# Patient Record
Sex: MALE | Race: WHITE | NOT HISPANIC OR LATINO | Employment: OTHER | URBAN - NONMETROPOLITAN AREA
[De-identification: names, ages, dates, MRNs, and addresses within clinical notes are randomized per-mention and may not be internally consistent; named-entity substitution may affect disease eponyms.]

---

## 2017-05-22 PROBLEM — Z47.89 ORTHOPEDIC AFTERCARE: Status: ACTIVE | Noted: 2017-05-22

## 2018-01-18 PROBLEM — M25.511 ACUTE PAIN OF RIGHT SHOULDER: Status: ACTIVE | Noted: 2018-01-18

## 2018-02-08 PROBLEM — M75.41 IMPINGEMENT SYNDROME OF SHOULDER, RIGHT: Status: ACTIVE | Noted: 2018-02-08

## 2018-09-12 PROBLEM — M25.561 ACUTE PAIN OF RIGHT KNEE: Status: ACTIVE | Noted: 2018-09-12

## 2018-09-12 PROBLEM — M25.562 ACUTE PAIN OF LEFT KNEE: Status: ACTIVE | Noted: 2018-09-12

## 2018-10-10 PROBLEM — M17.0 PRIMARY OSTEOARTHRITIS OF BOTH KNEES: Status: ACTIVE | Noted: 2018-10-10

## 2022-07-09 ENCOUNTER — OFFICE VISIT (OUTPATIENT)
Dept: URGENT CARE | Facility: CLINIC | Age: 57
End: 2022-07-09
Payer: COMMERCIAL

## 2022-07-09 ENCOUNTER — APPOINTMENT (OUTPATIENT)
Dept: RADIOLOGY | Facility: IMAGING CENTER | Age: 57
End: 2022-07-09
Attending: NURSE PRACTITIONER
Payer: COMMERCIAL

## 2022-07-09 ENCOUNTER — HOSPITAL ENCOUNTER (OUTPATIENT)
Facility: MEDICAL CENTER | Age: 57
End: 2022-07-09
Attending: NURSE PRACTITIONER
Payer: COMMERCIAL

## 2022-07-09 VITALS
BODY MASS INDEX: 37.22 KG/M2 | SYSTOLIC BLOOD PRESSURE: 136 MMHG | RESPIRATION RATE: 16 BRPM | HEART RATE: 58 BPM | HEIGHT: 74 IN | WEIGHT: 290 LBS | DIASTOLIC BLOOD PRESSURE: 88 MMHG | OXYGEN SATURATION: 96 % | TEMPERATURE: 97.4 F

## 2022-07-09 DIAGNOSIS — R05.9 COUGH: ICD-10-CM

## 2022-07-09 DIAGNOSIS — J34.89 NASAL CONGESTION WITH RHINORRHEA: ICD-10-CM

## 2022-07-09 DIAGNOSIS — R09.81 NASAL CONGESTION WITH RHINORRHEA: ICD-10-CM

## 2022-07-09 DIAGNOSIS — R53.83 OTHER FATIGUE: ICD-10-CM

## 2022-07-09 DIAGNOSIS — J01.90 ACUTE SINUSITIS, RECURRENCE NOT SPECIFIED, UNSPECIFIED LOCATION: ICD-10-CM

## 2022-07-09 PROCEDURE — U0005 INFEC AGEN DETEC AMPLI PROBE: HCPCS

## 2022-07-09 PROCEDURE — 71046 X-RAY EXAM CHEST 2 VIEWS: CPT | Mod: TC | Performed by: NURSE PRACTITIONER

## 2022-07-09 PROCEDURE — U0003 INFECTIOUS AGENT DETECTION BY NUCLEIC ACID (DNA OR RNA); SEVERE ACUTE RESPIRATORY SYNDROME CORONAVIRUS 2 (SARS-COV-2) (CORONAVIRUS DISEASE [COVID-19]), AMPLIFIED PROBE TECHNIQUE, MAKING USE OF HIGH THROUGHPUT TECHNOLOGIES AS DESCRIBED BY CMS-2020-01-R: HCPCS

## 2022-07-09 PROCEDURE — 99203 OFFICE O/P NEW LOW 30 MIN: CPT | Performed by: NURSE PRACTITIONER

## 2022-07-09 RX ORDER — AMOXICILLIN AND CLAVULANATE POTASSIUM 875; 125 MG/1; MG/1
1 TABLET, FILM COATED ORAL 2 TIMES DAILY
Qty: 14 TABLET | Refills: 0 | Status: SHIPPED | OUTPATIENT
Start: 2022-07-09 | End: 2022-07-16

## 2022-07-09 ASSESSMENT — ENCOUNTER SYMPTOMS
WHEEZING: 0
SORE THROAT: 1
EYE REDNESS: 0
NAUSEA: 0
WEAKNESS: 0
VOMITING: 0
NECK PAIN: 0
DIARRHEA: 0
CHILLS: 0
MYALGIAS: 0
COUGH: 1
SPUTUM PRODUCTION: 1
EYE DISCHARGE: 0
SHORTNESS OF BREATH: 0
DIZZINESS: 0
SINUS PAIN: 1
CARDIOVASCULAR NEGATIVE: 1
CONSTIPATION: 0
FEVER: 0
ABDOMINAL PAIN: 0
HEADACHES: 0

## 2022-07-09 NOTE — PROGRESS NOTES
Subjective     Archie Reid is a 57 y.o. male who presents with Cough (X2 weeks. Would like to be covid tested. At home tests were negative. ), Fatigue, and Pharyngitis            HPI  States has been experiencing nasal congestion, runny nose, postnasal drainage, sore throat, cough with phlegm, fatigue x2 weeks.  Has taken at home COVID test which have been negative.  Has used multiple over-the-counter medications for symptoms including DayQuil, NyQuil, Danielle-[place] plus.  No Mucinex or nasal spray/rinse or salt water gargle.  No history of asthma or pneumonia.  Denies shortness of breath or wheeze.  Requesting COVID PCR test today and evaluation for bacterial infection after 2 weeks of symptoms.    PMH:  has a past medical history of Allergy, Asthma (5/15/2017), At risk for sleep apnea (5/15/2017), Blood transfusion without reported diagnosis, Cancer (HCC) (5/15/2017), Cold (5/15/2017), Heart burn (5/15/2017), Hypertension, Jaundice (5/15/2017), Primary osteoarthritis of both knees (10/10/2018), and Snoring.    He has no past medical history of Anesthesia, Anginal syndrome (HCC), Arrhythmia, Blood clotting disorder (HCC), Bowel habit changes, Breath shortness, Bronchitis, Carcinoma in situ of respiratory system, Cataract, Congestive heart failure (HCC), Continuous ambulatory peritoneal dialysis status (HCC), Coughing blood, Dental disorder, Diabetes (HCC), Disorder of thyroid, Emphysema of lung (HCC), Glaucoma, Gynecological disorder, Heart murmur, Heart valve disease, Hemorrhagic disorder (HCC), Hepatitis A, Hepatitis B, Hepatitis C, Hiatus hernia syndrome, High cholesterol, Indigestion, Myocardial infarct (HCC), Pacemaker, Pneumonia, Pregnant, Psychiatric problem, Renal disorder, Rheumatic fever, Seizure (HCC), Sleep apnea, Stroke (HCC), Tuberculosis, Urinary bladder disorder, or Urinary incontinence.  MEDS:   Current Outpatient Medications:   •  ondansetron (ZOFRAN) 4 MG Tab tablet, Take 1 Tab by mouth every  four hours as needed for Nausea/Vomiting., Disp: 20 Tab, Rfl: 1  •  vitamin D (CHOLECALCIFEROL) 1000 UNIT Tab, Take 2,000 Units by mouth two times a day may change times on alt/days., Disp: , Rfl:   •  loratadine (CLARITIN) 10 MG Tab, Take 10 mg by mouth every day. Indications: Hayfever, Disp: , Rfl:   •  allopurinol (ZYLOPRIM) 100 MG Tab, Take 100 mg by mouth every day., Disp: , Rfl:   •  metoprolol SR (TOPROL XL) 25 MG TABLET SR 24 HR, Take 25 mg by mouth every day., Disp: , Rfl:   •  hydrocodone-acetaminophen (NORCO) 7.5-325 MG per tablet, Take 1-2 Tabs by mouth every four hours as needed. (Patient not taking: Reported on 7/9/2022), Disp: 41 Tab, Rfl: 0  •  mag-trisilicate/al-hydrox (GAVISCON) 80-20 MG Chew Tab, Take 1 Tab by mouth as needed (Takes as needed for heartburn). (Patient not taking: Reported on 7/9/2022), Disp: , Rfl:   ALLERGIES:   Allergies   Allergen Reactions   • Doxycycline Rash     Skin Rash     SURGHX:   Past Surgical History:   Procedure Laterality Date   • MENISCECTOMY, KNEE, MEDIAL Left 5/16/2017    Procedure: ARTHROSCOPY LEFT KNEE WITH PARTIAL MEDIAL MENISCECTOMY AND CHONDROPLASTY ;  Surgeon: Edward Olguin M.D.;  Location: SURGERY [facility];  Service:    • SHOULDER ARTHROSCOPY Left 11/2013    Biceps tenodesis, was in [place]   • KNEE ARTHROSCOPY Right 1/2012    Dr. Olguin at [place]   • TONSILLECTOMY  1985   • OTHER  1984    [place] teeth-put to sleep, no problems     SOCHX:  reports that he has never smoked. He has never used smokeless tobacco. He reports current alcohol use. He reports that he does not use drugs.  FH: Family history was reviewed, no pertinent findings to report    Review of Systems   Constitutional: Positive for malaise/fatigue. Negative for chills and fever.   HENT: Positive for congestion, ear pain, sinus pain and sore throat.    Eyes: Negative for discharge and redness.   Respiratory: Positive for cough and sputum production. Negative for shortness of breath and  "wheezing.    Cardiovascular: Negative.    Gastrointestinal: Negative for abdominal pain, constipation, diarrhea, nausea and vomiting.   Musculoskeletal: Negative for myalgias and neck pain.   Skin: Negative for itching and rash.   Neurological: Negative for dizziness, weakness and headaches.   Endo/Heme/Allergies: Negative for environmental allergies.   All other systems reviewed and are negative.             Objective     /88 (BP Location: Left arm, Patient Position: Sitting, BP Cuff Size: Adult)   Pulse (!) 58   Temp 36.3 °C (97.4 °F) (Temporal)   Resp 16   Ht 1.88 m (6' 2\")   Wt (!) 132 kg (290 lb)   SpO2 96%   BMI 37.23 kg/m²      Physical Exam  Vitals reviewed.   Constitutional:       General: He is awake. He is not in acute distress.     Appearance: Normal appearance. He is well-developed. He is not ill-appearing, toxic-appearing or diaphoretic.   HENT:      Head: Normocephalic.      Right Ear: Ear canal and external ear normal. A middle ear effusion is present.      Left Ear: Ear canal and external ear normal. A middle ear effusion is present.      Nose: Congestion and rhinorrhea present.      Mouth/Throat:      Lips: Pink.      Mouth: Mucous membranes are dry.      Pharynx: Oropharynx is clear. Uvula midline.   Eyes:      Conjunctiva/sclera: Conjunctivae normal.      Pupils: Pupils are equal, round, and reactive to light.   Cardiovascular:      Rate and Rhythm: Normal rate.   Pulmonary:      Effort: Pulmonary effort is normal. No tachypnea, accessory muscle usage or respiratory distress.      Breath sounds: Normal breath sounds and air entry. No stridor, decreased air movement or transmitted upper airway sounds. No decreased breath sounds, wheezing, rhonchi or rales.   Musculoskeletal:         General: Normal range of motion.      Cervical back: Normal range of motion and neck supple.   Skin:     General: Skin is warm and dry.   Neurological:      Mental Status: He is alert and oriented to " person, place, and time.   Psychiatric:         Attention and Perception: Attention normal.         Mood and Affect: Mood normal.         Speech: Speech normal.         Behavior: Behavior normal. Behavior is cooperative.                             Assessment & Plan        1. Cough    - DX-CHEST-2 VIEWS; Future  - SARS-CoV-2 PCR (24 hour In-House): Collect NP swab in VTM; Future    2. Nasal congestion with rhinorrhea    - SARS-CoV-2 PCR (24 hour In-House): Collect NP swab in VTM; Future    3. Other fatigue    - SARS-CoV-2 PCR (24 hour In-House): Collect NP swab in VTM; Future    4. Acute sinusitis, recurrence not specified, unspecified location    - amoxicillin-clavulanate (AUGMENTIN) 875-125 MG Tab; Take 1 Tablet by mouth 2 times a day for 7 days.  Dispense: 14 Tablet; Refill: 0    -Stay home isolated from others until COVID test resulted the follow CDC guidelines for positive cases as discussed  -Increase water intake  -May use over the counter Tylenol/Ibuprofen as needed for fever or body aches  -Get rest  -Salt water gargle as needed for any sore throat  -May use over the counter Flonase, saline nasal spray as needed for any nasal congestion  -May use over the counter cough suppressant medications like plain Robitussin/Delsym as needed   -May take over the counter Imodium as needed for diarrhea  -Monitor for fevers, cough, shortness of breath, chest pain, chest tightness, lethargy- need re-evaluation  -MyChart release for result, may take up to 48 hrs for result, patient notified, patient sent MyChart sign up text/AVS info to sign up to receive test result

## 2022-07-10 DIAGNOSIS — R53.83 OTHER FATIGUE: ICD-10-CM

## 2022-07-10 DIAGNOSIS — R05.9 COUGH: ICD-10-CM

## 2022-07-10 DIAGNOSIS — R09.81 NASAL CONGESTION WITH RHINORRHEA: ICD-10-CM

## 2022-07-10 DIAGNOSIS — J34.89 NASAL CONGESTION WITH RHINORRHEA: ICD-10-CM

## 2022-07-10 LAB — COVID ORDER STATUS COVID19: NORMAL

## 2022-07-11 LAB
SARS-COV-2 RNA RESP QL NAA+PROBE: NOTDETECTED
SPECIMEN SOURCE: NORMAL